# Patient Record
Sex: MALE | Race: OTHER | NOT HISPANIC OR LATINO
[De-identification: names, ages, dates, MRNs, and addresses within clinical notes are randomized per-mention and may not be internally consistent; named-entity substitution may affect disease eponyms.]

---

## 2021-08-18 PROBLEM — Z00.00 ENCOUNTER FOR PREVENTIVE HEALTH EXAMINATION: Status: ACTIVE | Noted: 2021-08-18

## 2021-08-27 ENCOUNTER — APPOINTMENT (OUTPATIENT)
Dept: NEUROSURGERY | Facility: CLINIC | Age: 61
End: 2021-08-27

## 2021-08-27 ENCOUNTER — APPOINTMENT (OUTPATIENT)
Dept: NEUROSURGERY | Facility: CLINIC | Age: 61
End: 2021-08-27
Payer: MEDICAID

## 2021-08-27 VITALS
HEART RATE: 86 BPM | BODY MASS INDEX: 27 KG/M2 | DIASTOLIC BLOOD PRESSURE: 89 MMHG | TEMPERATURE: 97.2 F | WEIGHT: 172 LBS | SYSTOLIC BLOOD PRESSURE: 144 MMHG | HEIGHT: 67 IN

## 2021-08-27 DIAGNOSIS — I65.22 OCCLUSION AND STENOSIS OF LEFT CAROTID ARTERY: ICD-10-CM

## 2021-08-27 PROCEDURE — 99205 OFFICE O/P NEW HI 60 MIN: CPT

## 2021-08-30 NOTE — HISTORY OF PRESENT ILLNESS
[de-identified] : AYESHA ABERNATHY is a 61 year male with a PMH of CAD s/p stenting, CHF, +PPM, DM, smoker who presents to the office today for neuroendovascular consultation due to recentl hospitalization at [facility] from 8/11-14/21 for sudden onset of dizziness, nausea and vomiting and incidental 2mm unruptured aneurysm seen on CTA during workup of his symptoms.  He reports no headache, nausea/vomiting, visual change, speech disturbance or current dizziness.  He still describes some intermittent lightheadedness at time. He has no family history of aneurysm. He reports smoking 3-4 cigarettes per day.\par

## 2021-08-30 NOTE — ASSESSMENT
[FreeTextEntry1] : I have discussed the natural history and treatment options for supraclinoid ICA aneurysms with the patient. I explained the indications for observation and imaging surveillance, medical management endovascular therapies and surgery. I discussed the risks, benefits, possible complications and expected outcome related to each treatment option.  In the end given there small size, I recommend yearly follow up as outpatient with CTA H/N imaging surveillance (patient has pacemaker). \par In regards to his left moderate carotid stenosis, I recommend carotid ultrasound to evaluate velocities. We will  reach out to him with results.  We will try to obtain previous carotid doppler results from Dr. Braun, the patient's cardiologist at [facility] for comparison. The patient should continue DAPT and should continue daily statin therapy with aim for goal LDL <70 and HbA1c <6.0 for stroke prevention.  He was counseled on the importance of smoking cessation and states he will try to to reach out to his PCP for help quitting.  \par The patient understands the plan of care and is in agreement.  All questions answered to patient satisfaction.\par  \par \par

## 2021-08-30 NOTE — ASSESSMENT
[FreeTextEntry1] : I have discussed the natural history and treatment options for supraclinoid ICA aneurysms with the patient. I explained the indications for observation and imaging surveillance, medical management endovascular therapies and surgery. I discussed the risks, benefits, possible complications and expected outcome related to each treatment option.  In the end given there small size, I recommend yearly follow up as outpatient with CTA H/N imaging surveillance (patient has pacemaker). \par In regards to his left moderate carotid stenosis, I recommend carotid ultrasound to evaluate velocities. We will  reach out to him with results.  We will try to obtain previous carotid doppler results from Dr. Braun, the patient's cardiologist at [facility] for comparison. The patient should continue DAPT and should continue daily statin therapy with aim for goal LDL <70 and HbA1c <6.0 for stroke prevention.  He was counseled on the importance of smoking cessation and states he will try to to reach out to his PCP for help quitting.  \par The patient understands the plan of care and is in agreement.  All questions answered to patient satisfaction.\par  \par \par

## 2021-08-30 NOTE — HISTORY OF PRESENT ILLNESS
[de-identified] : AYESHA ABERNATHY is a 61 year male with a PMH of CAD s/p stenting, CHF, +PPM, DM, smoker who presents to the office today for neuroendovascular consultation due to recentl hospitalization at [facility] from 8/11-14/21 for sudden onset of dizziness, nausea and vomiting and incidental 2mm unruptured aneurysm seen on CTA during workup of his symptoms.  He reports no headache, nausea/vomiting, visual change, speech disturbance or current dizziness.  He still describes some intermittent lightheadedness at time. He has no family history of aneurysm. He reports smoking 3-4 cigarettes per day.\par

## 2021-08-30 NOTE — PHYSICAL EXAM
[General Appearance - Alert] : alert [General Appearance - In No Acute Distress] : in no acute distress [Oriented To Time, Place, And Person] : oriented to person, place, and time [Impaired Insight] : insight and judgment were intact [Affect] : the affect was normal [Person] : oriented to person [Place] : oriented to place [Time] : oriented to time [Short Term Intact] : short term memory intact [Remote Intact] : remote memory intact [Span Intact] : the attention span was normal [Concentration Intact] : normal concentrating ability [Fluency] : fluency intact [Comprehension] : comprehension intact [Current Events] : adequate knowledge of current events [Past History] : adequate knowledge of personal past history [Vocabulary] : adequate range of vocabulary [Cranial Nerves Optic (II)] : visual acuity intact bilaterally,  pupils equal round and reactive to light [Cranial Nerves Oculomotor (III)] : extraocular motion intact [Cranial Nerves Trigeminal (V)] : facial sensation intact symmetrically [Cranial Nerves Facial (VII)] : face symmetrical [Cranial Nerves Vestibulocochlear (VIII)] : hearing was intact bilaterally [Cranial Nerves Glossopharyngeal (IX)] : tongue and palate midline [Cranial Nerves Accessory (XI - Cranial And Spinal)] : head turning and shoulder shrug symmetric [Cranial Nerves Hypoglossal (XII)] : there was no tongue deviation with protrusion [Motor Strength] : muscle strength was normal in all four extremities [Motor Tone] : muscle tone was normal in all four extremities [No Muscle Atrophy] : normal bulk in all four extremities [Sensation Tactile Decrease] : light touch was intact [Abnormal Walk] : normal gait [Balance] : balance was intact [2+] : Patella left 2+ [Past-pointing] : there was no past-pointing [Tremor] : no tremor present

## 2021-08-30 NOTE — DATA REVIEWED
[de-identified] : no acute infarct or bleed [de-identified] : NEURO DEFICIT, ACUTE, STROKE SUSPECTED \par \par  Technique: CT angiogram of the intracranial carotid arteries was performed was performed utilizing \par helical slices from the base of the skull through the vertex during bolus injection of intravenous \par contrast material. Overlapping reconstructions were obtained to allow for sagittal and coronal \par reformatted images . 3-D images were created from the data set. Images were also included. \par \par \par  Findings: Evaluation of the anterior circulation demonstrates moderate coarse calcifications along \par the bilateral cavernous and supraclinoid internal carotid arteries causing narrowing consistent with\par atherosclerotic disease. There is a approximately 2 mm outpouching along the posterior supraclinoid\par right internal carotid artery and an approximately 2 mm outpouching along the left posterior \par supraclinoid internal carotid artery that are highly suspicious for a tiny posterior communicating \par artery aneurysms. There is a normal appearance to the bilateral anterior cerebral and middle \par cerebral arteries. \par \par  Evaluation the posterior circulation demonstrates normal course and caliber of the bilateral \par vertebral arteries, vertebrobasilar junction and basilar artery. The bilateral posterior cerebral \par arteries are also within normal limits. \par \par  There is no evidence of stenosis . \par \par \par \par  CT angiogram of the extracranial carotid arteries: \par \par  Technique: CT angiogram of the extracranial carotid arteries was performed was performed utilizing \par helical slices from the base of the skull through the mediastinum during bolus injection of \par intravenous contrast material. Overlapping reconstructions were obtained to allow for sagittal and \par coronal reformatted images. 3-D reconstruction was also performed. \par \par  Findings: Evaluation of the aortic arch and the origin of the great vessels are within normal \par limits. \par \par  There are coarse calcifications at the right proximal internal carotid artery without narrowing \par consistent with atherosclerotic disease. There are mild calcifications with soft plaque at the \par proximal left internal carotid artery causing moderate narrowing approximately 9 mm from the \par bifurcation. There are coarse calcifications seen at the bifurcation and proximal right internal \par carotid artery without narrowing. The course and caliber of the bilateral common carotid, and \par external carotid arteries are within normal limits. There is no evidence of focal stenosis or \par dissection. \par \par  The origin of the vertebral arteries are within normal limits without evidence of stenosis. There \par is a 3 mm calcification along the left proximal vertebral artery and a small calcification along the\par left vertebral artery at the at the C3/C4 level causing minimal narrowing. The remaining course and \par caliber of the cervical segments of the vertebral arteries are normal. \par \par  There are multiple enlarged prevascular, anterior mediastinal and paratracheal lymph nodes \par consistent with lymphadenopathy. \par \par  There is mild enlargement of thyroid gland, left greater than right and isthmus with extension of \par the thyroid gland into the anterior mediastinum \par \par \par  Impression: \par \par  Approximately 2 mm out pouching along the right posterior supraclinoid internal carotid artery \par suspicious for a tiny right posterior communicating artery aneurysm. \par \par  Approximately 2 mm out pouching along the left posterior supraclinoid internal carotid artery \par suspicious for a tiny left posterior communicating artery aneurysm. \par \par  No evidence of narrowing of the proximal right internal carotid artery. \par \par  Moderate narrowing of the left proximal internal carotid artery approximately 9 mm from the \par bifurcation. \par \par \par  --- End of Report --- \par \par ***Electronically Signed *** \par ----------------------------------------------- \par Melina Early MD 08/12/21 1121 \par \par Dictated on 08/12/21 \par \par \par Report cc: Anum Nj DO;

## 2021-08-30 NOTE — DATA REVIEWED
[de-identified] : no acute infarct or bleed [de-identified] : NEURO DEFICIT, ACUTE, STROKE SUSPECTED \par \par  Technique: CT angiogram of the intracranial carotid arteries was performed was performed utilizing \par helical slices from the base of the skull through the vertex during bolus injection of intravenous \par contrast material. Overlapping reconstructions were obtained to allow for sagittal and coronal \par reformatted images . 3-D images were created from the data set. Images were also included. \par \par \par  Findings: Evaluation of the anterior circulation demonstrates moderate coarse calcifications along \par the bilateral cavernous and supraclinoid internal carotid arteries causing narrowing consistent with\par atherosclerotic disease. There is a approximately 2 mm outpouching along the posterior supraclinoid\par right internal carotid artery and an approximately 2 mm outpouching along the left posterior \par supraclinoid internal carotid artery that are highly suspicious for a tiny posterior communicating \par artery aneurysms. There is a normal appearance to the bilateral anterior cerebral and middle \par cerebral arteries. \par \par  Evaluation the posterior circulation demonstrates normal course and caliber of the bilateral \par vertebral arteries, vertebrobasilar junction and basilar artery. The bilateral posterior cerebral \par arteries are also within normal limits. \par \par  There is no evidence of stenosis . \par \par \par \par  CT angiogram of the extracranial carotid arteries: \par \par  Technique: CT angiogram of the extracranial carotid arteries was performed was performed utilizing \par helical slices from the base of the skull through the mediastinum during bolus injection of \par intravenous contrast material. Overlapping reconstructions were obtained to allow for sagittal and \par coronal reformatted images. 3-D reconstruction was also performed. \par \par  Findings: Evaluation of the aortic arch and the origin of the great vessels are within normal \par limits. \par \par  There are coarse calcifications at the right proximal internal carotid artery without narrowing \par consistent with atherosclerotic disease. There are mild calcifications with soft plaque at the \par proximal left internal carotid artery causing moderate narrowing approximately 9 mm from the \par bifurcation. There are coarse calcifications seen at the bifurcation and proximal right internal \par carotid artery without narrowing. The course and caliber of the bilateral common carotid, and \par external carotid arteries are within normal limits. There is no evidence of focal stenosis or \par dissection. \par \par  The origin of the vertebral arteries are within normal limits without evidence of stenosis. There \par is a 3 mm calcification along the left proximal vertebral artery and a small calcification along the\par left vertebral artery at the at the C3/C4 level causing minimal narrowing. The remaining course and \par caliber of the cervical segments of the vertebral arteries are normal. \par \par  There are multiple enlarged prevascular, anterior mediastinal and paratracheal lymph nodes \par consistent with lymphadenopathy. \par \par  There is mild enlargement of thyroid gland, left greater than right and isthmus with extension of \par the thyroid gland into the anterior mediastinum \par \par \par  Impression: \par \par  Approximately 2 mm out pouching along the right posterior supraclinoid internal carotid artery \par suspicious for a tiny right posterior communicating artery aneurysm. \par \par  Approximately 2 mm out pouching along the left posterior supraclinoid internal carotid artery \par suspicious for a tiny left posterior communicating artery aneurysm. \par \par  No evidence of narrowing of the proximal right internal carotid artery. \par \par  Moderate narrowing of the left proximal internal carotid artery approximately 9 mm from the \par bifurcation. \par \par \par  --- End of Report --- \par \par ***Electronically Signed *** \par ----------------------------------------------- \par Melina Early MD 08/12/21 1121 \par \par Dictated on 08/12/21 \par \par \par Report cc: Anmu Nj DO;

## 2021-09-08 ENCOUNTER — RESULT REVIEW (OUTPATIENT)
Age: 61
End: 2021-09-08

## 2022-02-10 DIAGNOSIS — I44.7 LEFT BUNDLE-BRANCH BLOCK, UNSPECIFIED: ICD-10-CM

## 2022-02-10 DIAGNOSIS — F17.200 NICOTINE DEPENDENCE, UNSPECIFIED, UNCOMPLICATED: ICD-10-CM

## 2022-02-10 DIAGNOSIS — I10 ESSENTIAL (PRIMARY) HYPERTENSION: ICD-10-CM

## 2022-02-10 DIAGNOSIS — Z95.810 PRESENCE OF AUTOMATIC (IMPLANTABLE) CARDIAC DEFIBRILLATOR: ICD-10-CM

## 2022-02-10 DIAGNOSIS — Z86.79 PERSONAL HISTORY OF OTHER DISEASES OF THE CIRCULATORY SYSTEM: ICD-10-CM

## 2022-02-10 DIAGNOSIS — E11.9 TYPE 2 DIABETES MELLITUS W/OUT COMPLICATIONS: ICD-10-CM

## 2022-02-10 DIAGNOSIS — I25.10 ATHEROSCLEROTIC HEART DISEASE OF NATIVE CORONARY ARTERY W/OUT ANGINA PECTORIS: ICD-10-CM

## 2022-02-16 DIAGNOSIS — E78.00 PURE HYPERCHOLESTEROLEMIA, UNSPECIFIED: ICD-10-CM

## 2022-02-16 DIAGNOSIS — K59.00 CONSTIPATION, UNSPECIFIED: ICD-10-CM

## 2022-02-16 DIAGNOSIS — I48.91 UNSPECIFIED ATRIAL FIBRILLATION: ICD-10-CM

## 2022-02-16 RX ORDER — SENNOSIDES 8.6 MG
8.6 TABLET ORAL
Refills: 0 | Status: ACTIVE | COMMUNITY

## 2022-02-16 RX ORDER — INSULIN DETEMIR 100 [IU]/ML
100 INJECTION, SOLUTION SUBCUTANEOUS
Refills: 0 | Status: ACTIVE | COMMUNITY

## 2022-02-16 RX ORDER — ATORVASTATIN CALCIUM 80 MG/1
80 TABLET, FILM COATED ORAL
Refills: 0 | Status: ACTIVE | COMMUNITY

## 2022-02-16 RX ORDER — AMIODARONE HYDROCHLORIDE 200 MG/1
200 TABLET ORAL
Refills: 0 | Status: DISCONTINUED | COMMUNITY
End: 2022-02-16

## 2022-02-16 RX ORDER — FUROSEMIDE 40 MG/1
40 TABLET ORAL
Refills: 0 | Status: DISCONTINUED | COMMUNITY
End: 2022-02-16

## 2022-02-16 RX ORDER — INSULIN ASPART 100 [IU]/ML
100 INJECTION, SOLUTION INTRAVENOUS; SUBCUTANEOUS
Refills: 0 | Status: ACTIVE | COMMUNITY

## 2022-02-16 RX ORDER — FUROSEMIDE 20 MG/1
20 TABLET ORAL
Refills: 0 | Status: ACTIVE | COMMUNITY

## 2022-02-16 RX ORDER — DOCUSATE SODIUM 100 MG/1
100 CAPSULE ORAL
Refills: 0 | Status: ACTIVE | COMMUNITY

## 2022-02-16 RX ORDER — ALBUTEROL 90 MCG
90 AEROSOL (GRAM) INHALATION
Refills: 0 | Status: ACTIVE | COMMUNITY

## 2022-02-16 RX ORDER — PRASUGREL 10 MG/1
10 TABLET, FILM COATED ORAL
Refills: 0 | Status: ACTIVE | COMMUNITY

## 2022-02-16 RX ORDER — PANTOPRAZOLE SODIUM 40 MG/1
40 GRANULE, DELAYED RELEASE ORAL
Refills: 0 | Status: DISCONTINUED | COMMUNITY
End: 2022-02-16

## 2022-02-16 RX ORDER — TICAGRELOR 90 MG/1
90 TABLET ORAL
Refills: 0 | Status: DISCONTINUED | COMMUNITY
End: 2022-02-16

## 2022-02-16 RX ORDER — SIMVASTATIN 40 MG/1
40 TABLET, FILM COATED ORAL
Refills: 0 | Status: DISCONTINUED | COMMUNITY
End: 2022-02-16

## 2022-02-16 RX ORDER — CARVEDILOL 6.25 MG/1
6.25 TABLET, FILM COATED ORAL
Refills: 0 | Status: ACTIVE | COMMUNITY

## 2022-02-16 RX ORDER — LORATADINE 10 MG
17 TABLET,DISINTEGRATING ORAL
Refills: 0 | Status: ACTIVE | COMMUNITY

## 2022-02-16 RX ORDER — LISINOPRIL 2.5 MG/1
2.5 TABLET ORAL
Refills: 0 | Status: ACTIVE | COMMUNITY

## 2022-02-17 ENCOUNTER — APPOINTMENT (OUTPATIENT)
Dept: UROLOGY | Facility: CLINIC | Age: 62
End: 2022-02-17

## 2022-07-13 DIAGNOSIS — Z01.812 ENCOUNTER FOR PREPROCEDURAL LABORATORY EXAMINATION: ICD-10-CM
